# Patient Record
Sex: FEMALE | Race: WHITE | NOT HISPANIC OR LATINO | URBAN - METROPOLITAN AREA
[De-identification: names, ages, dates, MRNs, and addresses within clinical notes are randomized per-mention and may not be internally consistent; named-entity substitution may affect disease eponyms.]

---

## 2018-12-04 ENCOUNTER — IMPORTED ENCOUNTER (OUTPATIENT)
Dept: URBAN - METROPOLITAN AREA CLINIC 50 | Facility: CLINIC | Age: 73
End: 2018-12-04

## 2019-01-24 ENCOUNTER — IMPORTED ENCOUNTER (OUTPATIENT)
Dept: URBAN - METROPOLITAN AREA CLINIC 50 | Facility: CLINIC | Age: 74
End: 2019-01-24

## 2019-01-29 ENCOUNTER — IMPORTED ENCOUNTER (OUTPATIENT)
Dept: URBAN - METROPOLITAN AREA CLINIC 50 | Facility: CLINIC | Age: 74
End: 2019-01-29

## 2019-02-06 ENCOUNTER — IMPORTED ENCOUNTER (OUTPATIENT)
Dept: URBAN - METROPOLITAN AREA CLINIC 50 | Facility: CLINIC | Age: 74
End: 2019-02-06

## 2019-02-06 NOTE — PATIENT DISCUSSION
"""S/P IOL OD: Sensar AAB00 22.5 +Omidria. Continue post operative instructions and drops per schedule.  """

## 2019-02-12 ENCOUNTER — IMPORTED ENCOUNTER (OUTPATIENT)
Dept: URBAN - METROPOLITAN AREA CLINIC 50 | Facility: CLINIC | Age: 74
End: 2019-02-12

## 2019-02-21 ENCOUNTER — IMPORTED ENCOUNTER (OUTPATIENT)
Dept: URBAN - METROPOLITAN AREA CLINIC 50 | Facility: CLINIC | Age: 74
End: 2019-02-21

## 2019-02-21 NOTE — PATIENT DISCUSSION
"""S/P IOL OS: Sensar AAB00 22 +Omidria. Continue post operative instructions and drops per schedule.  """

## 2019-02-26 ENCOUNTER — IMPORTED ENCOUNTER (OUTPATIENT)
Dept: URBAN - METROPOLITAN AREA CLINIC 50 | Facility: CLINIC | Age: 74
End: 2019-02-26

## 2019-03-26 ENCOUNTER — IMPORTED ENCOUNTER (OUTPATIENT)
Dept: URBAN - METROPOLITAN AREA CLINIC 50 | Facility: CLINIC | Age: 74
End: 2019-03-26

## 2019-04-02 ENCOUNTER — IMPORTED ENCOUNTER (OUTPATIENT)
Dept: URBAN - METROPOLITAN AREA CLINIC 50 | Facility: CLINIC | Age: 74
End: 2019-04-02

## 2019-04-15 ENCOUNTER — IMPORTED ENCOUNTER (OUTPATIENT)
Dept: URBAN - METROPOLITAN AREA CLINIC 50 | Facility: CLINIC | Age: 74
End: 2019-04-15

## 2019-06-28 ENCOUNTER — IMPORTED ENCOUNTER (OUTPATIENT)
Dept: URBAN - METROPOLITAN AREA CLINIC 50 | Facility: CLINIC | Age: 74
End: 2019-06-28

## 2019-07-16 ENCOUNTER — IMPORTED ENCOUNTER (OUTPATIENT)
Dept: URBAN - METROPOLITAN AREA CLINIC 50 | Facility: CLINIC | Age: 74
End: 2019-07-16

## 2020-03-06 ENCOUNTER — IMPORTED ENCOUNTER (OUTPATIENT)
Dept: URBAN - METROPOLITAN AREA CLINIC 50 | Facility: CLINIC | Age: 75
End: 2020-03-06

## 2020-03-06 NOTE — PATIENT DISCUSSION
"""Recommend Bilateral Upper Lid Blepharoplasty with excision of Lesion(s) right upper lid and left lower lid 03/17/2020 at [facility]. Discontinue blood thinner 6 days days prior to surgery.  Discussed with patient KEITH

## 2020-03-18 ENCOUNTER — IMPORTED ENCOUNTER (OUTPATIENT)
Dept: URBAN - METROPOLITAN AREA CLINIC 50 | Facility: CLINIC | Age: 75
End: 2020-03-18

## 2020-03-19 ENCOUNTER — IMPORTED ENCOUNTER (OUTPATIENT)
Dept: URBAN - METROPOLITAN AREA CLINIC 50 | Facility: CLINIC | Age: 75
End: 2020-03-19

## 2020-03-25 ENCOUNTER — IMPORTED ENCOUNTER (OUTPATIENT)
Dept: URBAN - METROPOLITAN AREA CLINIC 50 | Facility: CLINIC | Age: 75
End: 2020-03-25

## 2020-05-01 ENCOUNTER — IMPORTED ENCOUNTER (OUTPATIENT)
Dept: URBAN - METROPOLITAN AREA CLINIC 50 | Facility: CLINIC | Age: 75
End: 2020-05-01

## 2020-05-01 NOTE — PATIENT DISCUSSION
"""S/P Bilateral upper lid blepharoplasty.  Patient is healing well but concerned about some bumps ""

## 2020-07-21 ENCOUNTER — IMPORTED ENCOUNTER (OUTPATIENT)
Dept: URBAN - METROPOLITAN AREA CLINIC 50 | Facility: CLINIC | Age: 75
End: 2020-07-21

## 2021-03-02 NOTE — PATIENT DISCUSSION
PTOSIS  OU: VISUALLY SIGNIFICANT AT THIS TIME.  WILL TAKE CARE OF CATARACTS FIRST THEN CONSIDER SURGERY

## 2021-03-02 NOTE — PATIENT DISCUSSION
DERMATOCHALASIS OU: VISUALLY SIGNIFICANT AT THIS TIME.  WILL TAKE CARE OF CATARACTS FIRST THEN CONSIDER SURGERY

## 2021-03-29 NOTE — PATIENT DISCUSSION
New Prescription: ofloxacin (ofloxacin): drops: 0.3% 1 drop four times a day as directed into affected eye 03-

## 2021-04-08 NOTE — PATIENT DISCUSSION
Continue: ketorolac (ketorolac): drops: 0.5% 1 drop four times a day as directed into affected eye 03-

## 2021-04-08 NOTE — PATIENT DISCUSSION
Continue: ofloxacin (ofloxacin): drops: 0.3% 1 drop four times a day as directed into affected eye 03-

## 2021-04-17 ASSESSMENT — VISUAL ACUITY
OS_PH: @ 16 IN
OD_OTHER: 20/30. 20/40.
OS_OTHER: 20/70. 20/70.
OD_CC: 20/30-2
OD_SC: 20/70
OS_BAT: 20/70
OD_SC: 20/50
OS_SC: 20/30-2
OS_BAT: 20/50
OD_PH: @ 16 IN
OD_CC: 20/25
OD_BAT: 20/70
OS_CC: J1@ 16 IN
OD_OTHER: 20/70. 20/100.
OD_BAT: 20/30
OD_SC: 20/40
OS_CC: 20/40
OS_CC: 20/25
OD_CC: 20/25-
OS_CC: 20/70
OD_SC: 20/80+1
OS_BAT: 20/70
OS_SC: 20/25
OD_OTHER: 20/30. 20/30.
OD_CC: 20/25
OS_CC: 20/70-1
OS_CC: J3@ 18 IN
OS_OTHER: 20/70. 20/70.
OS_CC: 20/25
OD_BAT: 20/70
OS_OTHER: 20/70. >20/400.
OS_BAT: 20/30
OD_CC: J3@ 18 IN
OS_CC: 20/30-2
OD_CC: 20/40
OS_OTHER: 20/30. 20/40.
OD_CC: J1@ 16 IN
OS_CC: 20/50
OS_SC: 20/50
OD_OTHER: 20/70. 20/100.
OS_BAT: 20/70
OD_CC: 20/25
OS_CC: 20/25
OS_PH: 20/30
OS_CC: J1+
OS_CC: 20/30+
OD_CC: J1+
OD_CC: 20/80
OD_BAT: 20/30
OD_CC: 20/50
OS_OTHER: 20/50. 20/60.

## 2021-04-17 ASSESSMENT — TONOMETRY
OD_IOP_MMHG: 12
OS_IOP_MMHG: 10
OD_IOP_MMHG: 13
OD_IOP_MMHG: 10
OD_IOP_MMHG: 11
OS_IOP_MMHG: 10
OD_IOP_MMHG: 10
OD_IOP_MMHG: 12
OS_IOP_MMHG: 13
OD_IOP_MMHG: 10
OS_IOP_MMHG: 12
OD_IOP_MMHG: 13
OS_IOP_MMHG: 12
OS_IOP_MMHG: 10
OS_IOP_MMHG: 11
OD_IOP_MMHG: 12
OS_IOP_MMHG: 11
OS_IOP_MMHG: 12
OS_IOP_MMHG: 12
OS_IOP_MMHG: 11

## 2021-05-05 NOTE — PATIENT DISCUSSION
Stopped Today: ketorolac (ketorolac): drops: 0.5% 1 drop four times a day as directed into affected eye 03-

## 2021-05-05 NOTE — PATIENT DISCUSSION
2 WEEK POST-OP OS - Doing well. Continue to taper off of Pred Forte drops as directed. Rx was given for glasses. Monitor with annual exams.

## 2021-07-16 ENCOUNTER — PREPPED CHART (OUTPATIENT)
Dept: URBAN - METROPOLITAN AREA CLINIC 50 | Facility: CLINIC | Age: 76
End: 2021-07-16

## 2021-07-20 ENCOUNTER — COMPREHENSIVE EXAM (OUTPATIENT)
Dept: URBAN - METROPOLITAN AREA CLINIC 50 | Facility: CLINIC | Age: 76
End: 2021-07-20

## 2021-07-20 DIAGNOSIS — H26.493: ICD-10-CM

## 2021-07-20 PROCEDURE — 92014 COMPRE OPH EXAM EST PT 1/>: CPT

## 2021-07-20 PROCEDURE — 92015NC REFRACTION NO CHARGE

## 2021-07-20 PROCEDURE — 66821 AFTER CATARACT LASER SURGERY: CPT

## 2021-07-20 RX ORDER — PREDNISOLONE ACETATE 10 MG/ML
1 SUSPENSION/ DROPS OPHTHALMIC TWICE A DAY
Start: 2021-07-20

## 2021-07-20 ASSESSMENT — VISUAL ACUITY
OD_GLARE: 20/80
OD_PH: 20/30
OD_CC: 20/30
OS_GLARE: 20/200
OS_GLARE: 20/80
OD_GLARE: 20/200
OS_PH: 20/40+2
OU_CC: J2
OS_CC: 20/40

## 2021-07-20 ASSESSMENT — TONOMETRY
OD_IOP_MMHG: 16
OS_IOP_MMHG: 16

## 2021-07-20 NOTE — PATIENT DISCUSSION
PCO ([id number]): Visually significant PCO present on exam today. Recommend YAG laser capsulotomy to improve vision and decrease glare symptoms. RBAs of procedure discussed. Patient agrees and wishes to proceed.

## 2021-07-20 NOTE — PROCEDURE NOTE: CLINICAL
PROCEDURE NOTE: YAG Capsulotomy #1 OS. Diagnosis: Posterior Capsular Opacification (PCO). Prep: Mydriacil 1% and Phenylephrine 2.5%. Prior to treatment, the risks/benefits/alternatives were discussed. The patient wished to proceed with procedure. Consent was signed. Proparacaine and brominidine were placed into the operative eye after the eye was dilated. Power = 3.6mJ. Number of pulses = 41. Patient tolerated procedure well and there were no complications. Post Laser instructions given. Jori Pack

## 2021-07-27 ENCOUNTER — YAG CAPSULOTOMY OD (OUTPATIENT)
Dept: URBAN - METROPOLITAN AREA CLINIC 50 | Facility: CLINIC | Age: 76
End: 2021-07-27

## 2021-07-27 DIAGNOSIS — H26.491: ICD-10-CM

## 2021-07-27 PROCEDURE — 66821 AFTER CATARACT LASER SURGERY: CPT

## 2021-07-27 RX ORDER — PREDNISOLONE ACETATE 10 MG/ML: 1 SUSPENSION/ DROPS OPHTHALMIC TWICE A DAY

## 2021-07-27 ASSESSMENT — VISUAL ACUITY
OS_CC: 20/40
OS_PH: 20/30
OU_CC: 20/25-2
OD_CC: 20/30

## 2021-07-27 ASSESSMENT — TONOMETRY
OS_IOP_MMHG: 10
OD_IOP_MMHG: 10

## 2021-07-27 NOTE — PATIENT DISCUSSION
PCO ([id number]): Visually significant PCO present on exam today. Recommend YAG laser capsulotomy to improve vision and decrease glare symptoms. RBAs of procedure discussed. Patient agrees and wishes to proceed.

## 2021-07-27 NOTE — PROCEDURE NOTE: CLINICAL
PROCEDURE NOTE: YAG Capsulotomy OD. Diagnosis: Posterior Capsular Opacification (PCO). Prep: Mydriacil 1% and Phenylephrine 2.5%. Prior to treatment, the risks/benefits/alternatives were discussed. The patient wished to proceed with procedure. Consent was signed. Proparacaine and brominidine were placed into the operative eye after the eye was dilated. Power = 3.8mJ. Number of pulses = 50. Patient tolerated procedure well and there were no complications. Post Laser instructions given. Nehemias Grewal

## 2021-09-30 ENCOUNTER — POST-OP LASER (OUTPATIENT)
Dept: URBAN - METROPOLITAN AREA CLINIC 50 | Facility: CLINIC | Age: 76
End: 2021-09-30

## 2021-09-30 DIAGNOSIS — H16.223: ICD-10-CM

## 2021-09-30 DIAGNOSIS — Z98.890: ICD-10-CM

## 2021-09-30 DIAGNOSIS — H43.813: ICD-10-CM

## 2021-09-30 PROCEDURE — 99024 POSTOP FOLLOW-UP VISIT: CPT

## 2021-09-30 PROCEDURE — 92134 CPTRZ OPH DX IMG PST SGM RTA: CPT

## 2021-09-30 PROCEDURE — 92015NC REFRACTION NO CHARGE

## 2021-09-30 ASSESSMENT — TONOMETRY
OS_IOP_MMHG: 14
OD_IOP_MMHG: 13

## 2021-09-30 ASSESSMENT — VISUAL ACUITY
OS_CC: 20/30-1
OU_CC: J4@16IN
OU_CC: 20/30+2
OD_CC: 20/30-2

## 2021-09-30 NOTE — PATIENT DISCUSSION
Patient previously had eyelid surgery March of 2020 with Dr. Meg Flores. Patient would like to consult with Dr. Meg Flores since lids seem to be drooping again. [facility]. Patient to go ahead and schedule appointment did inform patient task would be sent to Dr. Tono Santos team since unsure how insurance would work since patient previously had surgery.

## 2021-09-30 NOTE — PATIENT DISCUSSION
Verbally consulted with Dr. Ana Blanton surgery is to last from 15-20 years, with patient just having bleph in 2020 there should not be a need for another bleph already. Patient ok to keep appointment to retest but no guarantee insurance will cover surgery.

## 2021-09-30 NOTE — PATIENT DISCUSSION
Drop regiment discussed with patient. Start PF tears OU 2-3x/day or as needed. Start warm compresses OU BID. Start lid scrubs OU BID. Start Gel drops or Ointment QHS. Patient advised to do blinking sequence a few times throughout the day.

## 2021-10-25 ENCOUNTER — DIAGNOSTICS - PTOSIS VF/PHOTOS (OUTPATIENT)
Dept: URBAN - METROPOLITAN AREA CLINIC 50 | Facility: CLINIC | Age: 76
End: 2021-10-25

## 2021-10-25 DIAGNOSIS — H02.834: ICD-10-CM

## 2021-10-25 DIAGNOSIS — H02.831: ICD-10-CM

## 2021-10-25 PROCEDURE — 92285 EXTERNAL OCULAR PHOTOGRAPHY: CPT

## 2021-10-25 PROCEDURE — 92082 INTERMEDIATE VISUAL FIELD XM: CPT

## 2021-10-25 NOTE — PATIENT DISCUSSION
Verbally consulted with Dr. Chari Hernandez surgery is to last from 15-20 years, with patient just having bleph in 2020 there should not be a need for another bleph already. Patient ok to keep appointment to retest but no guarantee insurance will cover surgery.

## 2021-10-25 NOTE — PATIENT DISCUSSION
Patient previously had eyelid surgery March of 2020 with Dr. Paco Madera. Patient would like to consult with Dr. Paco Madera since lids seem to be drooping again. Patient states [facility]. Patient to go ahead and schedule appointment did inform patient task would be sent to Dr. Sabino Lee team since unsure how insurance would work since patient previously had surgery.

## 2022-01-07 ENCOUNTER — CONSULTATION/EVALUATION (OUTPATIENT)
Dept: URBAN - METROPOLITAN AREA CLINIC 52 | Facility: CLINIC | Age: 77
End: 2022-01-07

## 2022-01-07 DIAGNOSIS — H57.813: ICD-10-CM

## 2022-01-07 PROCEDURE — 92012 INTRM OPH EXAM EST PATIENT: CPT

## 2022-01-07 ASSESSMENT — TONOMETRY
OD_IOP_MMHG: 11
OS_IOP_MMHG: 11

## 2022-01-07 ASSESSMENT — VISUAL ACUITY
OD_PH: 20/40
OD_CC: 20/50-1
OU_CC: 20/30-1
OS_PH: 20/30
OS_CC: 20/40

## 2022-01-07 NOTE — PATIENT DISCUSSION
Patient previously had eyelid surgery March of 2020 with Dr. Williams Gordillo. Patient would like to consult with Dr. Williams Gordillo since lids seem to be drooping again. Patient states [facility]. Patient to go ahead and schedule appointment did inform patient task would be sent to Dr. Calista Neumann team since unsure how insurance would work since patient previously had surgery.

## 2022-01-07 NOTE — PATIENT DISCUSSION
Verbally consulted with Dr. Maria Pea surgery is to last from 15-20 years, with patient just having bleph in 2020 there should not be a need for another bleph already. Patient ok to keep appointment to retest but no guarantee insurance will cover surgery.

## 2022-01-07 NOTE — PATIENT DISCUSSION
D/w patient she is in need of a brow lift. We can send information to [facility] plastic surgeons including her testing with a referral for a consult.

## 2022-06-07 NOTE — PATIENT DISCUSSION
Stable. patient getting extensive workup by pcp and seeing neurologist later this month. Instructed pt to give it time and watch. Continue baby aspirin and let neurologist check headaches. Will check back in 6 weeks. Will check with hospital and get report of MRI.

## 2022-07-25 NOTE — PATIENT DISCUSSION
Much improved. Patient still has some double on far periphery. Patient okay to drive. Instructed to close one eye if double occurs while driving. Will see back in 2 months to followup.

## 2022-10-04 ENCOUNTER — ESTABLISHED PATIENT (OUTPATIENT)
Dept: URBAN - METROPOLITAN AREA CLINIC 50 | Facility: CLINIC | Age: 77
End: 2022-10-04

## 2022-10-04 DIAGNOSIS — H02.831: ICD-10-CM

## 2022-10-04 DIAGNOSIS — H43.813: ICD-10-CM

## 2022-10-04 DIAGNOSIS — H35.3130: ICD-10-CM

## 2022-10-04 DIAGNOSIS — H02.834: ICD-10-CM

## 2022-10-04 PROCEDURE — 92134 CPTRZ OPH DX IMG PST SGM RTA: CPT

## 2022-10-04 PROCEDURE — 92015 DETERMINE REFRACTIVE STATE: CPT

## 2022-10-04 PROCEDURE — 92014 COMPRE OPH EXAM EST PT 1/>: CPT

## 2022-10-04 ASSESSMENT — VISUAL ACUITY
OD_CC: 20/30-1
OS_CC: 20/30-1
OU_CC: 20/30-2
OU_CC: J3@16"

## 2022-10-04 ASSESSMENT — TONOMETRY
OS_IOP_MMHG: 12
OD_IOP_MMHG: 12

## 2022-10-04 NOTE — PATIENT DISCUSSION
Verbally consulted with Dr. Aida Thorpe surgery is to last from 15-20 years, with patient just having bleph in 2020 there should not be a need for another bleph already. Patient ok to keep appointment to retest but no guarantee insurance will cover surgery.

## 2023-10-20 ENCOUNTER — COMPREHENSIVE EXAM (OUTPATIENT)
Dept: URBAN - METROPOLITAN AREA CLINIC 50 | Facility: LOCATION | Age: 78
End: 2023-10-20

## 2023-10-20 DIAGNOSIS — H43.813: ICD-10-CM

## 2023-10-20 DIAGNOSIS — H16.223: ICD-10-CM

## 2023-10-20 DIAGNOSIS — H52.4: ICD-10-CM

## 2023-10-20 DIAGNOSIS — H35.362: ICD-10-CM

## 2023-10-20 PROCEDURE — 92015 DETERMINE REFRACTIVE STATE: CPT

## 2023-10-20 PROCEDURE — 99214 OFFICE O/P EST MOD 30 MIN: CPT

## 2023-10-20 PROCEDURE — 92134 CPTRZ OPH DX IMG PST SGM RTA: CPT

## 2023-10-20 ASSESSMENT — VISUAL ACUITY
OS_CC: 20/30
OU_CC: 20/30
OD_CC: 20/40-2
OU_CC: J3@16"
OD_PH: 20/30

## 2023-10-20 ASSESSMENT — TONOMETRY
OS_IOP_MMHG: 14
OD_IOP_MMHG: 14